# Patient Record
Sex: FEMALE | Race: WHITE | NOT HISPANIC OR LATINO
[De-identification: names, ages, dates, MRNs, and addresses within clinical notes are randomized per-mention and may not be internally consistent; named-entity substitution may affect disease eponyms.]

---

## 2019-02-21 ENCOUNTER — RESULT REVIEW (OUTPATIENT)
Age: 59
End: 2019-02-21

## 2019-02-22 ENCOUNTER — APPOINTMENT (OUTPATIENT)
Dept: DERMATOLOGY | Facility: CLINIC | Age: 59
End: 2019-02-22
Payer: COMMERCIAL

## 2019-02-22 PROBLEM — Z00.00 ENCOUNTER FOR PREVENTIVE HEALTH EXAMINATION: Status: ACTIVE | Noted: 2019-02-22

## 2019-02-22 PROCEDURE — 11102 TANGNTL BX SKIN SINGLE LES: CPT

## 2019-02-22 PROCEDURE — 99204 OFFICE O/P NEW MOD 45 MIN: CPT | Mod: 25

## 2019-03-26 ENCOUNTER — APPOINTMENT (OUTPATIENT)
Dept: PULMONOLOGY | Facility: CLINIC | Age: 59
End: 2019-03-26
Payer: COMMERCIAL

## 2019-03-26 VITALS
BODY MASS INDEX: 25.52 KG/M2 | WEIGHT: 130 LBS | OXYGEN SATURATION: 98 % | HEIGHT: 60 IN | HEART RATE: 71 BPM | DIASTOLIC BLOOD PRESSURE: 82 MMHG | TEMPERATURE: 98.5 F | SYSTOLIC BLOOD PRESSURE: 120 MMHG

## 2019-03-26 PROCEDURE — 99205 OFFICE O/P NEW HI 60 MIN: CPT

## 2019-03-26 NOTE — ASSESSMENT
[FreeTextEntry1] : The patient is a 58-year-old lady with hypertension and known previous pulmonary disease who smokes 5 cigarettes a week.\par She has had upper respiratory symptoms, sinus pressure, mild hoarseness and persistent cough since late December\par She has been treated with several courses of antibiotics and steroids\par CT of the sinuses demonstrates significant pansinusitis with air fluid levels. It is most likely that her persistent symptoms are related to sinus disease. She has an appointment with Dr. Almonte from ENT tomorrow and she will follow this through\par \par The patient does not have any significant pulmonary disease. The 2 small pulmonary nodules do not need further followup due to their small size\par \par The bilateral small areas of groundglass appearance are likely inflammatory, or postinflammatory in nature. A repeat CT to monitor these is appropriate and we'll order one for 6 months from now.\par \par Extensive discussion of the implications of her pulmonary shadows was undertaken. The patient was quite concerned that she may have underlying pulmonary neoplasm and I think this is quite unlikely so I was able to reassure the patient\par \par I would like to see her after that has been accomplished

## 2019-03-26 NOTE — CONSULT LETTER
[Dear  ___] : Dear  [unfilled], [FreeTextEntry1] : I had the pleasure of evaluating your patient, OSMANI GRAY , in the office today.  Please review my consultation and evaluation report that follows below.  Please do not hesitate to call me if further information is necessary or if you wish to discuss ongoing care or diagnostic work-up.   \par I very much appreciate your referral and it is a privilege to be able to provide care for your patient.\par \par Sincerely,\par  \par Indra Daniels MD, MHCM, FACP\par Pulmonary Medicine\par  of Medicine\par [facility] [employer] at [facility]\par \par [email or web address]\par Multi-Specialties at [place]\par \par  [DrLisa  ___] : Dr. LEE

## 2019-07-25 ENCOUNTER — APPOINTMENT (OUTPATIENT)
Dept: ENDOCRINOLOGY | Facility: CLINIC | Age: 59
End: 2019-07-25
Payer: COMMERCIAL

## 2019-07-25 VITALS
WEIGHT: 135 LBS | HEIGHT: 60 IN | SYSTOLIC BLOOD PRESSURE: 122 MMHG | BODY MASS INDEX: 26.5 KG/M2 | HEART RATE: 74 BPM | DIASTOLIC BLOOD PRESSURE: 80 MMHG

## 2019-07-25 DIAGNOSIS — Z83.3 FAMILY HISTORY OF DIABETES MELLITUS: ICD-10-CM

## 2019-07-25 DIAGNOSIS — Z78.9 OTHER SPECIFIED HEALTH STATUS: ICD-10-CM

## 2019-07-25 DIAGNOSIS — Z87.39 PERSONAL HISTORY OF OTHER DISEASES OF THE MUSCULOSKELETAL SYSTEM AND CONNECTIVE TISSUE: ICD-10-CM

## 2019-07-25 DIAGNOSIS — Z86.39 PERSONAL HISTORY OF OTHER ENDOCRINE, NUTRITIONAL AND METABOLIC DISEASE: ICD-10-CM

## 2019-07-25 DIAGNOSIS — Z83.49 FAMILY HISTORY OF OTHER ENDOCRINE, NUTRITIONAL AND METABOLIC DISEASES: ICD-10-CM

## 2019-07-25 DIAGNOSIS — E83.52 HYPERCALCEMIA: ICD-10-CM

## 2019-07-25 PROCEDURE — 99203 OFFICE O/P NEW LOW 30 MIN: CPT

## 2019-07-25 RX ORDER — LOSARTAN POTASSIUM 50 MG/1
50 TABLET, FILM COATED ORAL DAILY
Qty: 90 | Refills: 0 | Status: ACTIVE | COMMUNITY

## 2019-07-25 RX ORDER — METOPROLOL SUCCINATE 50 MG/1
50 TABLET, EXTENDED RELEASE ORAL DAILY
Qty: 90 | Refills: 0 | Status: ACTIVE | COMMUNITY

## 2019-07-25 RX ORDER — OMEPRAZOLE 20 MG/1
20 CAPSULE, DELAYED RELEASE ORAL DAILY
Qty: 30 | Refills: 0 | Status: ACTIVE | COMMUNITY

## 2019-07-25 NOTE — PHYSICAL EXAM
[No Acute Distress] : no acute distress [Well Nourished] : well nourished [Well Developed] : well developed [Normal Sclera/Conjunctiva] : normal sclera/conjunctiva [No Proptosis] : no proptosis [No Neck Mass] : no neck mass was observed [No LAD] : no lymphadenopathy [Thyroid Not Enlarged] : the thyroid was not enlarged [No Thyroid Nodules] : there were no palpable thyroid nodules [No Respiratory Distress] : no respiratory distress [Clear to Auscultation] : lungs were clear to auscultation bilaterally [Normal Rate] : heart rate was normal  [Normal S1, S2] : normal S1 and S2 [Regular Rhythm] : with a regular rhythm [Murmurs] : no murmurs [No Edema] : there was no peripheral edema [Normal Gait] : normal gait [No Clubbing, Cyanosis] : no clubbing  or cyanosis of the fingernails [No Tremors] : no tremors [Normal Insight/Judgement] : insight and judgment were intact [Normal Affect] : the affect was normal [Normal Mood] : the mood was normal [de-identified] : Biceps DTRs 2 + b/l

## 2019-07-25 NOTE — REVIEW OF SYSTEMS
[Recent Weight Gain (___ Lbs)] : recent [unfilled] ~Ulb weight gain [Palpitations] : palpitations [Joint Pain] : joint pain [Neck Pain] : no neck pain [Shortness Of Breath] : no shortness of breath [Constipation] : no constipation [Abdominal Pain] : no abdominal pain [Back Pain] : no back pain [Hair Loss] : no hair loss [Tremors] : no tremors [Depression] : no depression [Polydipsia] : no polydipsia [FreeTextEntry8] : No kidney

## 2019-07-25 NOTE — HISTORY OF PRESENT ILLNESS
[FreeTextEntry1] : In March of this year she was found to have moderate hypercalcemia on routine labs ordered.  Her calcium was 12.7 with PTH of 97.  At the time she was instructed to D/C HCTZ and calcium supplements.  Her calcium has since improved to 9.8 (drawn last week).    \par \par Mother was recently diagnosed with hypercalcemia and is now seeing endocrinology.\par \par She denies any history of fractures or kidney stones.Complains of joint pain over the past 1-2 years, but no bone pain.  Denies any associated fatigue, mood disturbance or constipation.  She does report some forgetfulness.

## 2019-07-25 NOTE — REASON FOR VISIT
[Consultation] : a consultation visit [FreeTextEntry1] : Referred here by Dr. Hernandez for the evaluation of hypercalcemia

## 2019-07-25 NOTE — CONSULT LETTER
[Dear  ___] : Dear  [unfilled], [Consult Letter:] : I had the pleasure of evaluating your patient, [unfilled]. [Please see my note below.] : Please see my note below. [Consult Closing:] : Thank you very much for allowing me to participate in the care of this patient.  If you have any questions, please do not hesitate to contact me. [Sincerely,] : Sincerely, [FreeTextEntry3] : Farhan Romo MD, FACE\par

## 2019-07-25 NOTE — ASSESSMENT
[FreeTextEntry1] : 58 year old female with moderate hypercalcemia in setting of elevated PTH, which is suspicious for primary hyperparathyroidism, however this appears to have improved since discontinuing hydrochlorothiazide.  It is still likely that she has an underlying parathyroid disorder as one would expect suppressed PTH in setting of medication induced hypercalcemia.\par \par Will repeat labs now to check ionized calcium.\par Due to her family history, familial hypocalciuric hypercalcemia (FHH) is also possible, so will check 24 hour urine for calcium and creatinine.\par Check DXA of the forearm, spine and hip to rule out associated bone loss.\par Will also check neck US to evaluate for any parathyroid adenomas.

## 2019-07-25 NOTE — DATA REVIEWED
[FreeTextEntry1] : LABS:\par 3/20/2019\par Calcium 12.7\par PTH  97\par 25(OH)D  29\par \par 7/19/2019:\par Calcium  9.8\par PTH 83

## 2019-09-10 ENCOUNTER — FORM ENCOUNTER (OUTPATIENT)
Age: 59
End: 2019-09-10

## 2019-09-11 ENCOUNTER — APPOINTMENT (OUTPATIENT)
Dept: NUCLEAR MEDICINE | Facility: CLINIC | Age: 59
End: 2019-09-11
Payer: COMMERCIAL

## 2019-09-11 ENCOUNTER — OUTPATIENT (OUTPATIENT)
Dept: OUTPATIENT SERVICES | Facility: HOSPITAL | Age: 59
LOS: 1 days | End: 2019-09-11

## 2019-09-11 DIAGNOSIS — E21.0 PRIMARY HYPERPARATHYROIDISM: ICD-10-CM

## 2019-09-11 PROCEDURE — 78071 PARATHYRD PLANAR W/WO SUBTRJ: CPT | Mod: 26

## 2019-09-20 ENCOUNTER — APPOINTMENT (OUTPATIENT)
Dept: PULMONOLOGY | Facility: CLINIC | Age: 59
End: 2019-09-20

## 2019-09-23 ENCOUNTER — APPOINTMENT (OUTPATIENT)
Dept: PULMONOLOGY | Facility: CLINIC | Age: 59
End: 2019-09-23
Payer: COMMERCIAL

## 2019-09-23 VITALS
BODY MASS INDEX: 26.7 KG/M2 | WEIGHT: 136 LBS | OXYGEN SATURATION: 98 % | SYSTOLIC BLOOD PRESSURE: 160 MMHG | DIASTOLIC BLOOD PRESSURE: 80 MMHG | HEIGHT: 60 IN | RESPIRATION RATE: 16 BRPM | HEART RATE: 71 BPM

## 2019-09-23 VITALS — DIASTOLIC BLOOD PRESSURE: 80 MMHG | SYSTOLIC BLOOD PRESSURE: 127 MMHG

## 2019-09-23 DIAGNOSIS — J01.40 ACUTE PANSINUSITIS, UNSPECIFIED: ICD-10-CM

## 2019-09-23 PROCEDURE — 99215 OFFICE O/P EST HI 40 MIN: CPT

## 2019-09-23 NOTE — ASSESSMENT
[FreeTextEntry1] : The patient is a 59-year-old lady with pansinusitis that has improved\par \par She is a minimal smoker\par Previous CT of the chest demonstrated small pulmonary nodules in the right upper lobe 2 and 4 mm. She also had ground glass infiltrates in the right middle lobe and left lung base\par \par After treatment for her sinus disease her CT of the sinus has improved\par \par She has returned for a review of a convalescent CT chest\par \par The pulmonary nodules are still present and unchanged\par Previously noted infiltrates in the right middle lobe and left lung base are resolved\par There is a mild punctate nodule in the superior segment of left lower lobe that appears to be new meds consistent with infection\par \par The patient appears to have allergy as well as sinus disease and persistent postnasal drip\par She does not appear to have significant pulmonary disease however and she has evidence of her previous pneumonitis or inflammatory changes have resolved\par Her pulmonary nodules do not need regular followup\par \par I am recommending an interim reevaluation in one years time\par I have recommended smoking cessation completely\par \par I have recommended that she maintain followup with her allergist as well as ENT as I suspect she will run into problems with sinusitis again in the future

## 2019-09-23 NOTE — HISTORY OF PRESENT ILLNESS
[FreeTextEntry1] : The patient is a 59-year-old woman with pulmonary nodules as well and this groundglass appearance in both upper lung recently noted on CT\par \par This occurred at the same time that she had pansinusitis with air fluid levels\par \par Since last seen she has done much better with her sinus\par She reports a repeat CT of her sinuses demonstrates significant improvement according to her ENT doctor\par She is also seen an allergist, she was told she was allergic to grasses, she was given nasal steroids\par \par She is still complaining of occasional cough with white to yellow sputum

## 2019-09-23 NOTE — CONSULT LETTER
[Dear  ___] : Dear  [unfilled], [FreeTextEntry1] : I had the pleasure of evaluating your patient, OSMANI GRAY , in the office today.  Please review my consultation and evaluation report that follows below.  Please do not hesitate to call me if further information is necessary or if you wish to discuss ongoing care or diagnostic work-up.   \par I very much appreciate your referral and it is a privilege to be able to provide care for your patient.\par \par Sincerely,\par  \par Indra Daniels MD, MHCM, FACP\par Pulmonary Medicine\par  of Medicine\par [facility] [employer] [facility] at [facility]\par \par [email or web address]\par Multi-Specialties at [place]\par \par

## 2019-09-23 NOTE — PHYSICAL EXAM
[General Appearance - Well Developed] : well developed [Normal Appearance] : normal appearance [Well Groomed] : well groomed [General Appearance - Well Nourished] : well nourished [No Deformities] : no deformities [General Appearance - In No Acute Distress] : no acute distress [Normal Conjunctiva] : the conjunctiva exhibited no abnormalities [Eyelids - No Xanthelasma] : the eyelids demonstrated no xanthelasmas [Normal Oropharynx] : normal oropharynx [Neck Appearance] : the appearance of the neck was normal [Jugular Venous Distention Increased] : there was no jugular-venous distention [Neck Cervical Mass (___cm)] : no neck mass was observed [Thyroid Nodule] : there were no palpable thyroid nodules [Thyroid Diffuse Enlargement] : the thyroid was not enlarged [Heart Rate And Rhythm] : heart rate and rhythm were normal [Heart Sounds] : normal S1 and S2 [Murmurs] : no murmurs present [Respiration, Rhythm And Depth] : normal respiratory rhythm and effort [Exaggerated Use Of Accessory Muscles For Inspiration] : no accessory muscle use [Auscultation Breath Sounds / Voice Sounds] : lungs were clear to auscultation bilaterally [Abdomen Soft] : soft [Abdomen Tenderness] : non-tender [Abdomen Mass (___ Cm)] : no abdominal mass palpated [Abnormal Walk] : normal gait [Gait - Sufficient For Exercise Testing] : the gait was sufficient for exercise testing [Nail Clubbing] : no clubbing of the fingernails [Cyanosis, Localized] : no localized cyanosis [Petechial Hemorrhages (___cm)] : no petechial hemorrhages [] : no ischemic changes [Deep Tendon Reflexes (DTR)] : deep tendon reflexes were 2+ and symmetric [Sensation] : the sensory exam was normal to light touch and pinprick [Oriented To Time, Place, And Person] : oriented to person, place, and time [No Focal Deficits] : no focal deficits [Affect] : the affect was normal [Impaired Insight] : insight and judgment were intact

## 2019-10-15 ENCOUNTER — APPOINTMENT (OUTPATIENT)
Dept: SURGERY | Facility: CLINIC | Age: 59
End: 2019-10-15
Payer: COMMERCIAL

## 2019-10-15 VITALS
BODY MASS INDEX: 26.5 KG/M2 | WEIGHT: 135 LBS | DIASTOLIC BLOOD PRESSURE: 94 MMHG | SYSTOLIC BLOOD PRESSURE: 151 MMHG | HEIGHT: 60 IN | HEART RATE: 65 BPM

## 2019-10-15 PROCEDURE — 31575 DIAGNOSTIC LARYNGOSCOPY: CPT

## 2019-10-15 PROCEDURE — 99204 OFFICE O/P NEW MOD 45 MIN: CPT | Mod: 25

## 2019-10-19 NOTE — CONSULT LETTER
[Please see my note below.] : Please see my note below. [Consult Letter:] : I had the pleasure of evaluating your patient, [unfilled]. [Dear  ___] : Dear  [unfilled], [Consult Closing:] : Thank you very much for allowing me to participate in the care of this patient.  If you have any questions, please do not hesitate to contact me. [Sincerely,] : Sincerely, [FreeTextEntry2] : Dr. Farhan Romo, Dr. Yannick Hernandez [DrLisa  ___] : Dr. LEE [FreeTextEntry3] : .signature

## 2019-10-19 NOTE — HISTORY OF PRESENT ILLNESS
[de-identified] : Pt c/o elevated calcium for 1 year with  bone pain and fatigue.   denies constipation,  kidney stones, dysphagia, hoarseness  or RT exposure\par Ca 10.6,  PTH 12, 7 Ionized Ca 6.0, ucal 352\par Sonogram:   subcentimeter thyroid nodules\par Bone density:  osteoporosis \par SPECT:   Right parathyroid

## 2019-10-19 NOTE — PHYSICAL EXAM
[de-identified] : no palpable thyroid nodules [Nasal Endoscopy Performed] : nasal endoscopy was performed, see procedure section for findings [Laryngoscopy Performed] : laryngoscopy was performed, see procedure section for findings [Midline] : located in midline position [Normal] : orientation to person, place, and time: normal [de-identified] : fiberoptic laryngoscopy shows normal vocal cord mobility bilaterally with no lesions noted

## 2019-10-19 NOTE — ASSESSMENT
[FreeTextEntry1] : lengthy discussion regarding options for management. in view of labs and symptoms have recommended minimally invasive parathyroidectomy with PTH assay.   will require preop 4D CT.   risks, benefits and alternatives discussed at length.  I have discussed with the patient the anatomy of the area, the pathophysiology of the disease process and the rationale for surgery.  The attendant risks, possible complications and expected postoperative course have been discussed in detail.  I have given the patient the opportunity to ask questions, and all questions have been answered to the patient's satisfaction, and she wishes  to proceed with the planned procedure.\par

## 2019-11-04 ENCOUNTER — FORM ENCOUNTER (OUTPATIENT)
Age: 59
End: 2019-11-04

## 2019-11-05 ENCOUNTER — OUTPATIENT (OUTPATIENT)
Dept: OUTPATIENT SERVICES | Facility: HOSPITAL | Age: 59
LOS: 1 days | End: 2019-11-05

## 2019-11-05 ENCOUNTER — APPOINTMENT (OUTPATIENT)
Dept: CT IMAGING | Facility: CLINIC | Age: 59
End: 2019-11-05
Payer: COMMERCIAL

## 2019-11-05 DIAGNOSIS — E21.0 PRIMARY HYPERPARATHYROIDISM: ICD-10-CM

## 2019-11-05 PROCEDURE — 70491 CT SOFT TISSUE NECK W/DYE: CPT | Mod: 26

## 2019-11-08 ENCOUNTER — APPOINTMENT (OUTPATIENT)
Dept: ENDOCRINOLOGY | Facility: CLINIC | Age: 59
End: 2019-11-08
Payer: COMMERCIAL

## 2019-11-08 VITALS
HEIGHT: 60 IN | DIASTOLIC BLOOD PRESSURE: 70 MMHG | HEART RATE: 67 BPM | WEIGHT: 136 LBS | BODY MASS INDEX: 26.7 KG/M2 | SYSTOLIC BLOOD PRESSURE: 120 MMHG

## 2019-11-08 PROCEDURE — 99214 OFFICE O/P EST MOD 30 MIN: CPT

## 2019-11-08 RX ORDER — MULTIVITAMIN
CAPSULE ORAL
Refills: 0 | Status: ACTIVE | COMMUNITY

## 2019-11-08 NOTE — PHYSICAL EXAM
[No Acute Distress] : no acute distress [Normal Sclera/Conjunctiva] : normal sclera/conjunctiva [Well Developed] : well developed [Well Nourished] : well nourished [No Proptosis] : no proptosis [No LAD] : no lymphadenopathy [No Neck Mass] : no neck mass was observed [No Respiratory Distress] : no respiratory distress [No Thyroid Nodules] : there were no palpable thyroid nodules [Thyroid Not Enlarged] : the thyroid was not enlarged [Clear to Auscultation] : lungs were clear to auscultation bilaterally [Normal S1, S2] : normal S1 and S2 [Normal Rate] : heart rate was normal  [Regular Rhythm] : with a regular rhythm [Murmurs] : no murmurs [Normal Insight/Judgement] : insight and judgment were intact [Normal Mood] : the mood was normal [Normal Affect] : the affect was normal

## 2019-11-08 NOTE — REVIEW OF SYSTEMS
[Fatigue] : fatigue [Recent Weight Gain (___ Lbs)] : recent [unfilled] ~Ulb weight gain [Nausea] : nausea [Joint Pain] : joint pain [Chest Pain] : no chest pain [Shortness Of Breath] : no shortness of breath [Constipation] : no constipation [Abdominal Pain] : no abdominal pain

## 2019-11-08 NOTE — ASSESSMENT
[FreeTextEntry1] : 59 year old female with PHPT with related osteoporosis.  She also has multiple small thyroid nodules.  \par \par 1.  PHPT-  proceed with surgery\par 2.  MNG-  will need repeat imaging in one year.  \par 3.  OP-  after surgery will supplement with vitamin D3 1000 IU daily and aim for 1200 mg of calcium daily through diet and /or supplements.  Will need repeat DXA in one year and if no improvement, will consider adding pharmacologic therapy.

## 2019-11-08 NOTE — HISTORY OF PRESENT ILLNESS
[FreeTextEntry1] : Here for a routine follow up visit of primary hyperparathyroidism.\par Quality:  PHPT\par Severity:  moderate  (initial calcium was 12.7)\par Duration: since 3/2019\par Onset: found on routine labs\par Associated Symptoms:  Osteoporosis\par Modifying factors: \par \par Lab work up showed hypercalcemia with elevated PTH (127) and urine calcium to creatinine ratio of 0.02. \par DXA shows OP at the spine. \par Thyroid US showed right 0.9 cm isoechoic thyroid nodule.\par Sestamibi scan showed right parathyroid lesion. \par \par Complains of fatigue and achiness.  Just had 4D CT done and surgery is scheduled for January.

## 2019-12-27 ENCOUNTER — OUTPATIENT (OUTPATIENT)
Dept: OUTPATIENT SERVICES | Facility: HOSPITAL | Age: 59
LOS: 1 days | End: 2019-12-27

## 2019-12-27 VITALS
DIASTOLIC BLOOD PRESSURE: 80 MMHG | OXYGEN SATURATION: 97 % | TEMPERATURE: 97 F | SYSTOLIC BLOOD PRESSURE: 130 MMHG | HEIGHT: 59.5 IN | RESPIRATION RATE: 16 BRPM | HEART RATE: 87 BPM | WEIGHT: 134.92 LBS

## 2019-12-27 DIAGNOSIS — I10 ESSENTIAL (PRIMARY) HYPERTENSION: ICD-10-CM

## 2019-12-27 DIAGNOSIS — D21.9 BENIGN NEOPLASM OF CONNECTIVE AND OTHER SOFT TISSUE, UNSPECIFIED: Chronic | ICD-10-CM

## 2019-12-27 DIAGNOSIS — K21.9 GASTRO-ESOPHAGEAL REFLUX DISEASE WITHOUT ESOPHAGITIS: ICD-10-CM

## 2019-12-27 DIAGNOSIS — M54.9 DORSALGIA, UNSPECIFIED: ICD-10-CM

## 2019-12-27 DIAGNOSIS — E21.0 PRIMARY HYPERPARATHYROIDISM: ICD-10-CM

## 2019-12-27 DIAGNOSIS — Z98.891 HISTORY OF UTERINE SCAR FROM PREVIOUS SURGERY: Chronic | ICD-10-CM

## 2019-12-27 DIAGNOSIS — E83.52 HYPERCALCEMIA: ICD-10-CM

## 2019-12-27 LAB
ANION GAP SERPL CALC-SCNC: 10 MMO/L — SIGNIFICANT CHANGE UP (ref 7–14)
BUN SERPL-MCNC: 16 MG/DL — SIGNIFICANT CHANGE UP (ref 7–23)
CALCIUM SERPL-MCNC: 12.1 MG/DL — HIGH (ref 8.4–10.5)
CHLORIDE SERPL-SCNC: 100 MMOL/L — SIGNIFICANT CHANGE UP (ref 98–107)
CO2 SERPL-SCNC: 27 MMOL/L — SIGNIFICANT CHANGE UP (ref 22–31)
CREAT SERPL-MCNC: 0.61 MG/DL — SIGNIFICANT CHANGE UP (ref 0.5–1.3)
GLUCOSE SERPL-MCNC: 89 MG/DL — SIGNIFICANT CHANGE UP (ref 70–99)
HCT VFR BLD CALC: 47.2 % — HIGH (ref 34.5–45)
HGB BLD-MCNC: 15.3 G/DL — SIGNIFICANT CHANGE UP (ref 11.5–15.5)
MCHC RBC-ENTMCNC: 29.6 PG — SIGNIFICANT CHANGE UP (ref 27–34)
MCHC RBC-ENTMCNC: 32.4 % — SIGNIFICANT CHANGE UP (ref 32–36)
MCV RBC AUTO: 91.3 FL — SIGNIFICANT CHANGE UP (ref 80–100)
NRBC # FLD: 0 K/UL — SIGNIFICANT CHANGE UP (ref 0–0)
PLATELET # BLD AUTO: 265 K/UL — SIGNIFICANT CHANGE UP (ref 150–400)
PMV BLD: 10.1 FL — SIGNIFICANT CHANGE UP (ref 7–13)
POTASSIUM SERPL-MCNC: 4.2 MMOL/L — SIGNIFICANT CHANGE UP (ref 3.5–5.3)
POTASSIUM SERPL-SCNC: 4.2 MMOL/L — SIGNIFICANT CHANGE UP (ref 3.5–5.3)
RBC # BLD: 5.17 M/UL — SIGNIFICANT CHANGE UP (ref 3.8–5.2)
RBC # FLD: 12.6 % — SIGNIFICANT CHANGE UP (ref 10.3–14.5)
SODIUM SERPL-SCNC: 137 MMOL/L — SIGNIFICANT CHANGE UP (ref 135–145)
WBC # BLD: 5.81 K/UL — SIGNIFICANT CHANGE UP (ref 3.8–10.5)
WBC # FLD AUTO: 5.81 K/UL — SIGNIFICANT CHANGE UP (ref 3.8–10.5)

## 2019-12-27 NOTE — H&P PST ADULT - HISTORY OF PRESENT ILLNESS
Pt is a 59 y.o. female ; pt reports h/o hypercalcemia first noted " earlier 2019 " Pt referred to endocrine ; referred to surgeon ;pt  now presents for Parathyroidectomy with Parathyroid Hormone Assay

## 2019-12-27 NOTE — H&P PST ADULT - SYMPTOMS
none/occasional palpitations ; sob on exertion ; " bending , 1 flight stairs occasional palpitations ; sob on exertion ; " bending , 1 flight stairs. Pt denies cp, palpitations ,sob @ present in PST/none

## 2019-12-27 NOTE — H&P PST ADULT - NSICDXPROBLEM_GEN_ALL_CORE_FT
PROBLEM DIAGNOSES  Problem: Hypercalcemia  Assessment and Plan: Parathyroidectomy with parathyroid Hormone Assay  Pre op instructions including  Hibiclens with teach back  reviewed with pt, pt verbalized good understanding of pre op instructions  Pt to [facility] for pre op medical evaluation  Pt reports h/osob on exertion. palpitations    . Pt reports heaviness " r/t congestion improved with singulair  PT DENIES CP, PALPITATIONS , SOB IN PST ; PT OFFERS NO C/O VSS PT IN NAD  Cardiology referral as per pcp  Call to surgeons office message left for Margi awaiting return call    Problem: HTN (hypertension)  Assessment and Plan: Pt to take Metoprolol dos  Pt to take Losartan dos    Problem: GERD (gastroesophageal reflux disease)  Assessment and Plan: Pt to take Omeprazole dos PROBLEM DIAGNOSES  Problem: Hypercalcemia  Assessment and Plan: Parathyroidectomy with Parathyroid Hormone Assay  Pre op instructions including  Hibiclens with teach back  reviewed with pt, pt verbalized good understanding of pre op instructions  Pt to [facility] for pre op medical evaluation  Pt reports h/o sob on exertion. palpitations  , chest heaviness   Pt denies cp, palpitations, sob in pst , vss , pt in nad   Cardiology evaluation pre op   Call to surgeons office message left for Margi awaiting return call    Problem: HTN (hypertension)  Assessment and Plan: Pt to take Metoprolol dos  Pt to take Losartan dos    Problem: GERD (gastroesophageal reflux disease)  Assessment and Plan: Pt to take Omeprazole dos PROBLEM DIAGNOSES  Problem: Hypercalcemia  Assessment and Plan: Parathyroidectomy with Parathyroid Hormone Assay  Pre op instructions including  Hibiclens with teach back  reviewed with pt, pt verbalized good understanding of pre op instructions  Pt to [facility] for pre op medical evaluation  EKG faxed to pcp  Pt reports h/o sob on exertion. palpitations  , chest heaviness   Pt denies cp, palpitations, sob in pst , vss , pt in nad   Cardiology evaluation pre op   Call to surgeons office message left for Margi awaiting return call    Problem: HTN (hypertension)  Assessment and Plan: Pt to take Metoprolol dos  Pt to take Losartan dos    Problem: GERD (gastroesophageal reflux disease)  Assessment and Plan: Pt to take Omeprazole dos PROBLEM DIAGNOSES  Problem: Hypercalcemia  Assessment and Plan: Parathyroidectomy with Parathyroid Hormone Assay  Pre op instructions including  Hibiclens with teach back  reviewed with pt, pt verbalized good understanding of pre op instructions  Pt to [facility] for pre op medical evaluation  EKG faxed to pcp  Pt reports h/o sob on exertion. palpitations  , chest heaviness   Pt denies cp, palpitations, sob in pst , vss , pt in nad   Cardiology evaluation pre op   Call to surgeons office message left for Margi awaiting return call    Problem: HTN (hypertension)  Assessment and Plan: Pt to take Metoprolol dos  Pt to take Losartan dos    Problem: GERD (gastroesophageal reflux disease)  Assessment and Plan: Pt to take Omeprazole dos    Problem: Back pain  Assessment and Plan: Pt reports pain lumbar and cervical spine  request most recent studies   Call to surgeons office message left for Margi awaiting return call

## 2019-12-27 NOTE — H&P PST ADULT - CARDIOVASCULAR SYMPTOMS
dyspnea on exertion/palpitations palpitations/pt reports occasional chest heaviness/dyspnea on exertion

## 2019-12-27 NOTE — H&P PST ADULT - VENOUS THROMBOEMBOLISM CURRENT LABS/TEST RESULTS
Addended by: TAMIKO NOYOLA on: 11/12/2019 11:30 AM     Modules accepted: Level of Service, SmartSet    
none

## 2019-12-27 NOTE — H&P PST ADULT - NEGATIVE NEUROLOGICAL SYMPTOMS
no tremors/no focal seizures/no syncope/no weakness/no generalized seizures/no transient paralysis/no paresthesias

## 2020-01-03 PROBLEM — Z87.39 PERSONAL HISTORY OF OTHER DISEASES OF THE MUSCULOSKELETAL SYSTEM AND CONNECTIVE TISSUE: Chronic | Status: ACTIVE | Noted: 2019-12-27

## 2020-01-03 PROBLEM — I10 ESSENTIAL (PRIMARY) HYPERTENSION: Chronic | Status: ACTIVE | Noted: 2019-12-27

## 2020-01-03 PROBLEM — Z91.09 OTHER ALLERGY STATUS, OTHER THAN TO DRUGS AND BIOLOGICAL SUBSTANCES: Chronic | Status: ACTIVE | Noted: 2019-12-27

## 2020-01-03 PROBLEM — D21.9 BENIGN NEOPLASM OF CONNECTIVE AND OTHER SOFT TISSUE, UNSPECIFIED: Chronic | Status: ACTIVE | Noted: 2019-12-27

## 2020-01-03 PROBLEM — E83.52 HYPERCALCEMIA: Chronic | Status: ACTIVE | Noted: 2019-12-27

## 2020-01-03 PROBLEM — K21.9 GASTRO-ESOPHAGEAL REFLUX DISEASE WITHOUT ESOPHAGITIS: Chronic | Status: ACTIVE | Noted: 2019-12-27

## 2020-01-05 ENCOUNTER — TRANSCRIPTION ENCOUNTER (OUTPATIENT)
Age: 60
End: 2020-01-05

## 2020-01-06 ENCOUNTER — OTHER (OUTPATIENT)
Age: 60
End: 2020-01-06

## 2020-01-06 ENCOUNTER — APPOINTMENT (OUTPATIENT)
Dept: SURGERY | Facility: HOSPITAL | Age: 60
End: 2020-01-06

## 2020-01-06 ENCOUNTER — OUTPATIENT (OUTPATIENT)
Dept: OUTPATIENT SERVICES | Facility: HOSPITAL | Age: 60
LOS: 1 days | Discharge: HOME | End: 2020-01-06
Payer: COMMERCIAL

## 2020-01-06 ENCOUNTER — RESULT REVIEW (OUTPATIENT)
Age: 60
End: 2020-01-06

## 2020-01-06 VITALS
SYSTOLIC BLOOD PRESSURE: 146 MMHG | WEIGHT: 134.92 LBS | TEMPERATURE: 98 F | HEIGHT: 59.5 IN | HEART RATE: 68 BPM | DIASTOLIC BLOOD PRESSURE: 88 MMHG | OXYGEN SATURATION: 98 % | RESPIRATION RATE: 16 BRPM

## 2020-01-06 VITALS
DIASTOLIC BLOOD PRESSURE: 82 MMHG | SYSTOLIC BLOOD PRESSURE: 139 MMHG | RESPIRATION RATE: 16 BRPM | HEART RATE: 72 BPM | OXYGEN SATURATION: 96 %

## 2020-01-06 DIAGNOSIS — D21.9 BENIGN NEOPLASM OF CONNECTIVE AND OTHER SOFT TISSUE, UNSPECIFIED: Chronic | ICD-10-CM

## 2020-01-06 DIAGNOSIS — E21.0 PRIMARY HYPERPARATHYROIDISM: ICD-10-CM

## 2020-01-06 DIAGNOSIS — Z98.891 HISTORY OF UTERINE SCAR FROM PREVIOUS SURGERY: Chronic | ICD-10-CM

## 2020-01-06 PROCEDURE — 88331 PATH CONSLTJ SURG 1 BLK 1SPC: CPT | Mod: 26

## 2020-01-06 PROCEDURE — 60500 EXPLORE PARATHYROID GLANDS: CPT | Mod: AS

## 2020-01-06 PROCEDURE — 60500 EXPLORE PARATHYROID GLANDS: CPT

## 2020-01-06 PROCEDURE — 88305 TISSUE EXAM BY PATHOLOGIST: CPT | Mod: 26

## 2020-01-06 RX ORDER — ACETAMINOPHEN 500 MG
2 TABLET ORAL
Qty: 0 | Refills: 0 | DISCHARGE
Start: 2020-01-06

## 2020-01-06 RX ORDER — SODIUM CHLORIDE 9 MG/ML
1000 INJECTION, SOLUTION INTRAVENOUS
Refills: 0 | Status: DISCONTINUED | OUTPATIENT
Start: 2020-01-06 | End: 2020-01-07

## 2020-01-06 RX ORDER — OMEPRAZOLE 10 MG/1
1 CAPSULE, DELAYED RELEASE ORAL
Qty: 0 | Refills: 0 | DISCHARGE

## 2020-01-06 RX ORDER — OXYCODONE AND ACETAMINOPHEN 5; 325 MG/1; MG/1
1 TABLET ORAL EVERY 4 HOURS
Refills: 0 | Status: DISCONTINUED | OUTPATIENT
Start: 2020-01-06 | End: 2020-01-07

## 2020-01-06 RX ORDER — CALCIUM CARBONATE 500(1250)
1 TABLET ORAL
Refills: 0 | Status: DISCONTINUED | OUTPATIENT
Start: 2020-01-06 | End: 2020-01-07

## 2020-01-06 RX ORDER — FEXOFENADINE HCL 30 MG
1 TABLET ORAL
Qty: 0 | Refills: 0 | DISCHARGE

## 2020-01-06 RX ORDER — CALCIUM CARBONATE 500(1250)
1 TABLET ORAL
Qty: 0 | Refills: 0 | DISCHARGE
Start: 2020-01-06

## 2020-01-06 RX ORDER — ACETAMINOPHEN 500 MG
650 TABLET ORAL EVERY 6 HOURS
Refills: 0 | Status: DISCONTINUED | OUTPATIENT
Start: 2020-01-06 | End: 2020-01-07

## 2020-01-06 RX ORDER — BENZOCAINE AND MENTHOL 5; 1 G/100ML; G/100ML
1 LIQUID ORAL
Refills: 0 | Status: DISCONTINUED | OUTPATIENT
Start: 2020-01-06 | End: 2020-01-07

## 2020-01-06 RX ORDER — LOSARTAN POTASSIUM 100 MG/1
1 TABLET, FILM COATED ORAL
Qty: 0 | Refills: 0 | DISCHARGE

## 2020-01-06 RX ORDER — METOPROLOL TARTRATE 50 MG
1 TABLET ORAL
Qty: 0 | Refills: 0 | DISCHARGE

## 2020-01-06 RX ORDER — MONTELUKAST 4 MG/1
1 TABLET, CHEWABLE ORAL
Qty: 0 | Refills: 0 | DISCHARGE

## 2020-01-06 RX ORDER — AZELASTINE HYDROCHLORIDE AND FLUTICASONE PROPIONATE 137; 50 UG/1; UG/1
1 SPRAY, METERED NASAL
Qty: 0 | Refills: 0 | DISCHARGE

## 2020-01-06 RX ADMIN — Medication 1 TABLET(S): at 15:19

## 2020-01-06 RX ADMIN — BENZOCAINE AND MENTHOL 1 LOZENGE: 5; 1 LIQUID ORAL at 15:20

## 2020-01-06 NOTE — ASU DISCHARGE PLAN (ADULT/PEDIATRIC) - CARE PROVIDER_API CALL
Bertram Velazquez)  Plastic Surgery  [address] [place] [address] [address]  [place] [address]  Phone: [phone]  Fax: [phone]  Follow Up Time:

## 2020-01-06 NOTE — ASU DISCHARGE PLAN (ADULT/PEDIATRIC) - MEDICATION INSTRUCTIONS
You received IV Tylenol for pain management at 1:30 PM. Please DO NOT take any Tylenol (Acetaminophen) containing products, such as Vicodin, Percocet, Excedrin, and cold medications for the next 6 hours (until 7:30 PM). DO NOT TAKE MORE THAN 3000 MG OF TYLENOL in a 24 hour period.

## 2020-01-06 NOTE — BRIEF OPERATIVE NOTE - COMMENTS
no qualified surgical resident available I assisted in dissection of parathyroid hemostasis and closure of surgical wound

## 2020-01-06 NOTE — ASU PATIENT PROFILE, ADULT - PMH
Environmental allergies    Fibroids  s/p UFE  GERD (gastroesophageal reflux disease)    History of dislocation of shoulder  Right 2015  Hypercalcemia    Hypertension

## 2020-01-06 NOTE — ASU DISCHARGE PLAN (ADULT/PEDIATRIC) - CALL YOUR DOCTOR IF YOU HAVE ANY OF THE FOLLOWING:
Inability to tolerate liquids or foods/Wound/Surgical Site with redness, or foul smelling discharge or pus/Numbness, tingling, color or temperature change to extremity/Bleeding that does not stop/Nausea and vomiting that does not stop/Unable to urinate

## 2020-01-06 NOTE — BRIEF OPERATIVE NOTE - NSICDXBRIEFPROCEDURE_GEN_ALL_CORE_FT
PROCEDURES:  Parathyroidectomy, with intraoperative PTH measurement 06-Jan-2020 14:06:11  Abi Feliciano

## 2020-01-08 DIAGNOSIS — E21.0 PRIMARY HYPERPARATHYROIDISM: ICD-10-CM

## 2020-01-10 LAB — SURGICAL PATHOLOGY STUDY: SIGNIFICANT CHANGE UP

## 2020-01-16 ENCOUNTER — APPOINTMENT (OUTPATIENT)
Dept: SURGERY | Facility: CLINIC | Age: 60
End: 2020-01-16
Payer: COMMERCIAL

## 2020-01-16 PROCEDURE — 36415 COLL VENOUS BLD VENIPUNCTURE: CPT

## 2020-01-16 PROCEDURE — 99024 POSTOP FOLLOW-UP VISIT: CPT

## 2020-01-16 NOTE — PHYSICAL EXAM
[de-identified] : well healed incision minimal postop swelling [Midline] : located in midline position [Normal] : orientation to person, place, and time: normal [de-identified] : Neg chvostek's sign

## 2020-01-16 NOTE — ASSESSMENT
[FreeTextEntry1] : s/p parathyroidectomy\par daily care\par path reviewed\par blood work today\par f/u 3 months

## 2020-01-21 ENCOUNTER — RESULT REVIEW (OUTPATIENT)
Age: 60
End: 2020-01-21

## 2020-01-21 LAB
25(OH)D3 SERPL-MCNC: 33.8 NG/ML
CALCIUM SERPL-MCNC: 10.1 MG/DL
CALCIUM SERPL-MCNC: 10.1 MG/DL
PARATHYROID HORMONE INTACT: 48 PG/ML

## 2020-03-16 ENCOUNTER — APPOINTMENT (OUTPATIENT)
Dept: PULMONOLOGY | Facility: CLINIC | Age: 60
End: 2020-03-16
Payer: COMMERCIAL

## 2020-03-16 ENCOUNTER — NON-APPOINTMENT (OUTPATIENT)
Age: 60
End: 2020-03-16

## 2020-03-16 VITALS
HEIGHT: 60 IN | HEART RATE: 61 BPM | RESPIRATION RATE: 14 BRPM | OXYGEN SATURATION: 97 % | TEMPERATURE: 98.9 F | SYSTOLIC BLOOD PRESSURE: 156 MMHG | BODY MASS INDEX: 26.5 KG/M2 | DIASTOLIC BLOOD PRESSURE: 117 MMHG | WEIGHT: 135 LBS

## 2020-03-16 PROCEDURE — 99215 OFFICE O/P EST HI 40 MIN: CPT | Mod: 25

## 2020-03-16 PROCEDURE — 94010 BREATHING CAPACITY TEST: CPT

## 2020-03-16 NOTE — ASSESSMENT
[FreeTextEntry1] : 59-year-old lady with long allergic history as well as recurrent sinusitis\par She has probably had postnasal drip\par \par Based on her history spirometry and physical exam, I do not believe that she has significant airways disease or asthma\par \par I suspect her cough is still related to postnasal drip related to her her sinus issues or allergy related\par \par I have encouraged the patient to utilize the previously recommended as azelastine/fluticasone at the regular dose of one spray each nostril twice a day\par \par She should continue her short course of steroids and antibiotics and finish\par \par She says she will be seeing her allergist in a number of weeks\par \par I would like to see her again prior to the summer as previously planned

## 2020-03-16 NOTE — CONSULT LETTER
[Dear  ___] : Dear  [unfilled], [FreeTextEntry1] : I had the pleasure of evaluating your patient, OSMANI GRAY , in the office today.  Please review my consultation and evaluation report that follows below.  Please do not hesitate to call me if further information is necessary or if you wish to discuss ongoing care or diagnostic work-up.   \par I very much appreciate your referral and it is a privilege to be able to provide care for your patient.\par \par Sincerely,\par  \par Idnra Daniels MD, MHCM, FACP\par Pulmonary Medicine\par  of Medicine\par [facility] at [facility]\par \par [email or web address]\par Multi-Specialties at [place]\par \par

## 2020-03-16 NOTE — HISTORY OF PRESENT ILLNESS
[TextBox_4] : The patient is a 59-year-old lady with a long history of sinusitis and previous air-fluid level\par She also has been followed by an allergist for multiple environmental allergies\par \par She was supposed to return here for an interim reevaluation in 6 months but she comes earlier at the suggestion of Dr. Cho\par \par Her last CAT scan of the chest showed improvement in ground glass appearance of densities in the upper lobes with persistence of small less than 4 mm pulmonary nodules right upper lobe\par The patient is some minor smoker and does not pose higher risk for pulmonary neoplasm\par \par The patient had been prescribed azelastine fluticasone nasal spray by her allergist but she has not taken this regularly and she was only taking this once a day\par \par Recent CT of the sinuses demonstrated significant improvement in air fluid levels but she has evidence of chronic sinus disease\par \par She was just given a course of oral steroids and antibiotics by Dr. Cho\par \par Her complaint is cough but it is minimal and nonproductive\par She has no history of asthma or airways disease

## 2020-05-08 ENCOUNTER — APPOINTMENT (OUTPATIENT)
Dept: ENDOCRINOLOGY | Facility: CLINIC | Age: 60
End: 2020-05-08
Payer: COMMERCIAL

## 2020-05-08 DIAGNOSIS — E04.2 NONTOXIC MULTINODULAR GOITER: ICD-10-CM

## 2020-05-08 PROCEDURE — 99214 OFFICE O/P EST MOD 30 MIN: CPT | Mod: 95

## 2020-05-08 NOTE — REVIEW OF SYSTEMS
[Joint Pain] : joint pain [Constipation] : no constipation [Fatigue] : no fatigue [Abdominal Pain] : no abdominal pain

## 2020-05-08 NOTE — PHYSICAL EXAM
[Healthy Appearance] : healthy appearance [No Acute Distress] : no acute distress [Well Healed Scar] : well healed scar [Normal Affect] : the affect was normal [Normal Insight/Judgement] : insight and judgment were intact [Normal Mood] : the mood was normal [de-identified] : No visible thyromegaly

## 2020-05-08 NOTE — HISTORY OF PRESENT ILLNESS
[Medical Office: ([facility])___] : at the medical office located in  [Home] : at home, [unfilled] , at the time of the visit. [Patient] : the patient [Self] : self [FreeTextEntry1] : Telehealth visit conducted due to COVID-19 Pandemic\par Time started:  11:59AM, Time Ended: 12:19 PM\par \par Follow up of  primary hyperparathyroidism, OP and thyroid nodules\par Quality:  PHPT\par Severity:  moderate  (initial calcium was 12.7)\par Duration: since 3/2019\par Onset: found on routine labs\par Associated Symptoms:  Osteoporosis\par Modifying factors:  Better after parathyroidectomy. \par \par Lab work up showed hypercalcemia with elevated PTH (127) and urine calcium to creatinine ratio of 0.02. \par DXA shows OP at the spine. \par Thyroid US showed right 0.9 cm isoechoic thyroid nodule.\par Sestamibi scan showed right parathyroid lesion. \par Had successful resection of right upper 2.2 gram hypercellular parathyroid with Dr. Velazquez in 1/2020.\par \par Currently feels well with no associated fatigue.  Does complain of some arthralgias which have not improved after surgery.

## 2020-05-08 NOTE — ASSESSMENT
[FreeTextEntry1] : 59 year old female with PHPT, now resolved s/p resection of right parathyroid gland in 1/2020 with related osteoporosis.  She also has multiple small thyroid nodules.  \par \par 1.  PHPT-  resolved.  Will obtain copy of labs from Dr. Hernandez done in office this morning.  \par 2.  [facility]-  will arrange for follow up imaging at time of next visit.  \par 3.  Osteoporosis-  discussed weight bearing exercise and goal of 1200 mg of calcium daily.  Recommended adding vitamin D3 1000 IU once a day.  Will repeat DXA one year after surgery (1/2021).  If no improvement in BMD, consider adding pharmacologic therapy at that time.

## 2020-05-08 NOTE — REASON FOR VISIT
[Osteoporosis] : osteoporosis [Follow - Up] : a follow-up visit [Hyperparathyroidism] : hyperparathyroidism

## 2020-05-08 NOTE — CONSULT LETTER
[Courtesy Letter:] : I had the pleasure of seeing your patient, [unfilled], in my office today. [Dear  ___] : Dear  [unfilled], [Please see my note below.] : Please see my note below. [Consult Closing:] : Thank you very much for allowing me to participate in the care of this patient.  If you have any questions, please do not hesitate to contact me. [Sincerely,] : Sincerely, [FreeTextEntry3] : Farhan Romo MD, FACE\par

## 2020-05-08 NOTE — DATA REVIEWED
[FreeTextEntry1] : LABS:\par 3/20/2019\par Calcium 12.7\par PTH  97\par 25(OH)D  29\par \par DXA 8/5/2019:\par L spine T score  -2.5\par R femoral neck -2.1\par radius  -1.3\par \par Thyroid US  8/5/2019:\par heterogeneous gland\par RMP 0.9 cm isoechoic nodule\par Several cysts measuring < 5 mm.  \par

## 2020-05-14 ENCOUNTER — APPOINTMENT (OUTPATIENT)
Dept: SURGERY | Facility: CLINIC | Age: 60
End: 2020-05-14
Payer: COMMERCIAL

## 2020-05-14 DIAGNOSIS — E21.0 PRIMARY HYPERPARATHYROIDISM: ICD-10-CM

## 2020-05-14 PROCEDURE — 99213 OFFICE O/P EST LOW 20 MIN: CPT | Mod: 95

## 2020-05-14 NOTE — ASSESSMENT
[FreeTextEntry1] : will obtain results of recent blood tests.  instructed in maneuvers to minimize scarring. recommended continuing calcium due to prior osteoporosis. continued f/u with PCP.  patient has been given the opportunity to ask questions, and all of the patient's questions have been answered to their satisfaction\par

## 2020-07-05 NOTE — H&P PST ADULT - NSANTHOSAYNRD_GEN_A_CORE
Noel Gallardo MD
No. BRITTANIE screening performed.  STOP BANG Legend: 0-2 = LOW Risk; 3-4 = INTERMEDIATE Risk; 5-8 = HIGH Risk

## 2020-09-16 ENCOUNTER — APPOINTMENT (OUTPATIENT)
Dept: PULMONOLOGY | Facility: CLINIC | Age: 60
End: 2020-09-16

## 2020-12-04 ENCOUNTER — APPOINTMENT (OUTPATIENT)
Dept: PULMONOLOGY | Facility: CLINIC | Age: 60
End: 2020-12-04
Payer: COMMERCIAL

## 2020-12-04 VITALS — BODY MASS INDEX: 22.66 KG/M2 | WEIGHT: 136 LBS | HEIGHT: 65 IN

## 2020-12-04 VITALS
DIASTOLIC BLOOD PRESSURE: 90 MMHG | RESPIRATION RATE: 14 BRPM | SYSTOLIC BLOOD PRESSURE: 120 MMHG | HEART RATE: 74 BPM | OXYGEN SATURATION: 97 %

## 2020-12-04 DIAGNOSIS — I10 ESSENTIAL (PRIMARY) HYPERTENSION: ICD-10-CM

## 2020-12-04 DIAGNOSIS — R09.82 POSTNASAL DRIP: ICD-10-CM

## 2020-12-04 PROCEDURE — 99214 OFFICE O/P EST MOD 30 MIN: CPT

## 2020-12-04 PROCEDURE — 99072 ADDL SUPL MATRL&STAF TM PHE: CPT

## 2020-12-04 NOTE — HISTORY OF PRESENT ILLNESS
[TextBox_4] : 61 yo lady with hx of recurrent sinusitis and cough\par  Currently she no longer has sinusitis , cough  or postnasal drip\par \par Pulmonary nodules previously noted did not require further f/u tho she had GG infiltrates that resolved

## 2020-12-04 NOTE — ASSESSMENT
[FreeTextEntry1] : 61 yo lady with hx sinusitis, postnasal drip and cough\par These have largely resolved at this time\par \par Will repeat CXR now to monitor infiltrates altho no indication for CT chest f/u of nodules\par \par Note hypertension with diastolic >90 which patient says has been confirmed at home\par Recommend urgent f/u by her primary doctor for HT management

## 2021-09-27 ENCOUNTER — TRANSCRIPTION ENCOUNTER (OUTPATIENT)
Age: 61
End: 2021-09-27

## 2021-10-06 PROBLEM — I10 ESSENTIAL HYPERTENSION: Status: ACTIVE | Noted: 2019-03-26

## 2021-12-02 ENCOUNTER — TRANSCRIPTION ENCOUNTER (OUTPATIENT)
Age: 61
End: 2021-12-02

## 2022-02-09 ENCOUNTER — TRANSCRIPTION ENCOUNTER (OUTPATIENT)
Age: 62
End: 2022-02-09

## 2022-05-10 ENCOUNTER — LABORATORY RESULT (OUTPATIENT)
Age: 62
End: 2022-05-10

## 2022-05-10 ENCOUNTER — APPOINTMENT (OUTPATIENT)
Dept: RHEUMATOLOGY | Facility: CLINIC | Age: 62
End: 2022-05-10
Payer: COMMERCIAL

## 2022-05-10 VITALS
WEIGHT: 135 LBS | RESPIRATION RATE: 16 BRPM | HEART RATE: 72 BPM | BODY MASS INDEX: 22.49 KG/M2 | DIASTOLIC BLOOD PRESSURE: 91 MMHG | SYSTOLIC BLOOD PRESSURE: 162 MMHG | HEIGHT: 65 IN | OXYGEN SATURATION: 99 %

## 2022-05-10 DIAGNOSIS — M25.50 PAIN IN UNSPECIFIED JOINT: ICD-10-CM

## 2022-05-10 DIAGNOSIS — R91.1 SOLITARY PULMONARY NODULE: ICD-10-CM

## 2022-05-10 DIAGNOSIS — M81.0 AGE-RELATED OSTEOPOROSIS W/OUT CURRENT PATHOLOGICAL FRACTURE: ICD-10-CM

## 2022-05-10 PROCEDURE — 99204 OFFICE O/P NEW MOD 45 MIN: CPT | Mod: 25

## 2022-05-11 LAB
ALBUMIN SERPL ELPH-MCNC: 5.1 G/DL
ALP BLD-CCNC: 61 U/L
ALT SERPL-CCNC: 38 U/L
ANION GAP SERPL CALC-SCNC: 13 MMOL/L
AST SERPL-CCNC: 23 U/L
BASOPHILS # BLD AUTO: 0.04 K/UL
BASOPHILS NFR BLD AUTO: 0.7 %
BILIRUB SERPL-MCNC: 0.4 MG/DL
BUN SERPL-MCNC: 16 MG/DL
CALCIUM SERPL-MCNC: 10.2 MG/DL
CCP AB SER IA-ACNC: <8 UNITS
CHLORIDE SERPL-SCNC: 102 MMOL/L
CO2 SERPL-SCNC: 25 MMOL/L
CREAT SERPL-MCNC: 0.76 MG/DL
CRP SERPL-MCNC: 3 MG/L
EGFR: 90 ML/MIN/1.73M2
EOSINOPHIL # BLD AUTO: 0.1 K/UL
EOSINOPHIL NFR BLD AUTO: 1.6 %
ERYTHROCYTE [SEDIMENTATION RATE] IN BLOOD BY WESTERGREN METHOD: 11 MM/HR
GLUCOSE SERPL-MCNC: 111 MG/DL
HCT VFR BLD CALC: 47.7 %
HGB BLD-MCNC: 15.2 G/DL
IMM GRANULOCYTES NFR BLD AUTO: 0.5 %
LYMPHOCYTES # BLD AUTO: 1.52 K/UL
LYMPHOCYTES NFR BLD AUTO: 24.8 %
MAN DIFF?: NORMAL
MCHC RBC-ENTMCNC: 29.4 PG
MCHC RBC-ENTMCNC: 31.9 GM/DL
MCV RBC AUTO: 92.3 FL
MONOCYTES # BLD AUTO: 0.45 K/UL
MONOCYTES NFR BLD AUTO: 7.3 %
NEUTROPHILS # BLD AUTO: 4 K/UL
NEUTROPHILS NFR BLD AUTO: 65.1 %
PLATELET # BLD AUTO: 281 K/UL
POTASSIUM SERPL-SCNC: 4.7 MMOL/L
PROT SERPL-MCNC: 7.7 G/DL
RBC # BLD: 5.17 M/UL
RBC # FLD: 12.5 %
RF+CCP IGG SER-IMP: NEGATIVE
RHEUMATOID FACT SER QL: 17 IU/ML
SODIUM SERPL-SCNC: 139 MMOL/L
URATE SERPL-MCNC: 3.8 MG/DL
WBC # FLD AUTO: 6.14 K/UL

## 2022-05-12 LAB — ANACR T: NEGATIVE

## 2022-05-13 LAB
A PHAGOCYTOPH IGG TITR SER IF: NORMAL TITER
A-TUMOR NECROSIS FACT SERPL-MCNC: <1.7 PG/ML
B BURGDOR AB SER QL IA: NEGATIVE
B MICROTI IGG TITR SER: NORMAL TITER
E CHAFFEENSIS IGG TITR SER IF: NORMAL TITER
IGNF SERPL-MCNC: <4.2 PG/ML
IL10 SERPL-MCNC: <2.8 PG/ML
IL12 SERPL-MCNC: <1.9 PG/ML
IL13 SERPL-MCNC: <1.7 PG/ML
IL17A SERPL-MCNC: <1.4 PG/ML
IL2 SERPL-MCNC: 302 PG/ML
IL2 SERPL-MCNC: <2.1 PG/ML
IL4 SERPL-MCNC: <2.2 PG/ML
IL6 SERPL-MCNC: <2 PG/ML
IL8 SERPL-MCNC: <3 PG/ML
INTERLEUKIN 1 BETA: <6.5 PG/ML
INTERLEUKIN 5: <2.1 PG/ML

## 2022-05-19 LAB — 14-3-3 ETA AG SER IA-MCNC: <0.2 NG/ML

## 2022-05-23 PROBLEM — M81.0 AGE-RELATED OSTEOPOROSIS WITHOUT CURRENT PATHOLOGICAL FRACTURE: Status: ACTIVE | Noted: 2019-11-08

## 2022-05-23 PROBLEM — R91.1 PULMONARY NODULE: Status: ACTIVE | Noted: 2019-03-26

## 2022-05-23 PROBLEM — M25.50 POLYARTHRALGIA: Status: ACTIVE | Noted: 2022-05-10

## 2022-08-03 NOTE — H&P PST ADULT - NSICDXPASTMEDICALHX_GEN_ALL_CORE_FT
Continue to monitor    PAST MEDICAL HISTORY:  Environmental allergies     Fibroids s/p UFE    GERD (gastroesophageal reflux disease)     Hypercalcemia     Hypertension PAST MEDICAL HISTORY:  Environmental allergies     Fibroids s/p UFE    GERD (gastroesophageal reflux disease)     History of dislocation of shoulder Right 2015    Hypercalcemia     Hypertension

## 2024-06-03 ENCOUNTER — OFFICE (OUTPATIENT)
Dept: URBAN - METROPOLITAN AREA CLINIC 113 | Facility: CLINIC | Age: 64
Setting detail: OPHTHALMOLOGY
End: 2024-06-03
Payer: COMMERCIAL

## 2024-06-03 DIAGNOSIS — H00.021: ICD-10-CM

## 2024-06-03 PROCEDURE — 92012 INTRM OPH EXAM EST PATIENT: CPT | Performed by: OPTOMETRIST

## 2024-06-03 ASSESSMENT — CONFRONTATIONAL VISUAL FIELD TEST (CVF)
OD_FINDINGS: FULL
OS_FINDINGS: FULL

## 2024-09-10 ENCOUNTER — NON-APPOINTMENT (OUTPATIENT)
Age: 64
End: 2024-09-10

## 2025-02-18 ENCOUNTER — NON-APPOINTMENT (OUTPATIENT)
Age: 65
End: 2025-02-18